# Patient Record
Sex: FEMALE | Race: OTHER | HISPANIC OR LATINO
[De-identification: names, ages, dates, MRNs, and addresses within clinical notes are randomized per-mention and may not be internally consistent; named-entity substitution may affect disease eponyms.]

---

## 2019-07-23 ENCOUNTER — APPOINTMENT (OUTPATIENT)
Dept: OBGYN | Facility: CLINIC | Age: 39
End: 2019-07-23
Payer: COMMERCIAL

## 2019-07-23 VITALS
HEIGHT: 62 IN | WEIGHT: 169 LBS | BODY MASS INDEX: 31.1 KG/M2 | SYSTOLIC BLOOD PRESSURE: 118 MMHG | DIASTOLIC BLOOD PRESSURE: 70 MMHG

## 2019-07-23 DIAGNOSIS — Z00.00 ENCOUNTER FOR GENERAL ADULT MEDICAL EXAMINATION W/OUT ABNORMAL FINDINGS: ICD-10-CM

## 2019-07-23 DIAGNOSIS — Z85.3 PERSONAL HISTORY OF MALIGNANT NEOPLASM OF BREAST: ICD-10-CM

## 2019-07-23 DIAGNOSIS — Z78.9 OTHER SPECIFIED HEALTH STATUS: ICD-10-CM

## 2019-07-23 DIAGNOSIS — Z85.850 PERSONAL HISTORY OF MALIGNANT NEOPLASM OF THYROID: ICD-10-CM

## 2019-07-23 DIAGNOSIS — Z80.8 FAMILY HISTORY OF MALIGNANT NEOPLASM OF OTHER ORGANS OR SYSTEMS: ICD-10-CM

## 2019-07-23 DIAGNOSIS — Z01.419 ENCOUNTER FOR GYNECOLOGICAL EXAMINATION (GENERAL) (ROUTINE) W/OUT ABNORMAL FINDINGS: ICD-10-CM

## 2019-07-23 DIAGNOSIS — Z80.3 FAMILY HISTORY OF MALIGNANT NEOPLASM OF BREAST: ICD-10-CM

## 2019-07-23 PROCEDURE — 36415 COLL VENOUS BLD VENIPUNCTURE: CPT

## 2019-07-23 PROCEDURE — 99385 PREV VISIT NEW AGE 18-39: CPT

## 2019-07-24 LAB
LH SERPL-ACNC: 6.9 IU/L
T3 SERPL-MCNC: 87 NG/DL
T3FREE SERPL-MCNC: 2.96 PG/ML
T4 FREE SERPL-MCNC: 1.2 NG/DL
T4 SERPL-MCNC: 4.9 UG/DL
TSH SERPL-ACNC: 2.81 UIU/ML

## 2019-08-06 LAB
CYTOLOGY CVX/VAG DOC THIN PREP: ABNORMAL
HPV HIGH+LOW RISK DNA PNL CVX: NOT DETECTED

## 2019-08-14 NOTE — HISTORY OF PRESENT ILLNESS
[1 Year Ago] : 1 year ago [Good] : being in good health [Healthy Diet] : a healthy diet [Regular Exercise] : regular exercise [Up to Date] : up to date with ~his/her~ STD screening [Menarche ____ yo] : menarche at [unfilled] yo [M. Frequency ___ (days)] : menses frequency [unfilled] days [M. Duration ___ (days)] : menses duration [unfilled] day(s) [Current] : cancer screening reviewed and current [Pap Smear Last Year] : Papanicolaou cytology last year [No Previous Mammogram] : no previous mammogram [No Previous CRC Screening] : no previous screening [Weight Concerns] : no concerns with her weight [Menstrual Problems] : reports normal menses [Fever] : no fever [Nausea] : no nausea [Vomiting] : no vomiting [Diarrhea] : no diarrhea [Pelvic Pressure] : no pelvic pressure [Vaginal Bleeding] : no vaginal bleeding [Dysuria] : no dysuria [Burning] : no burning [Lesion] : no lesion [Localized Pain] : no localized pain [Irregular Menses] : normal menses [Hot Flashes] : no hot flashes [Patient travelled to an area with active Zika Virus transmission in the last 6 months] : Patient is trying to get pregnant and she did not travel to an area with active Zika virus transmission in the last 6 months. [Sexually Active] : is not sexually active [Patient's partner travelled to an area with active Zika Virus transmission in the last 6 months] : Patient's partner did not travel to an area with active Zika Virus transmission in the last 6 months

## 2021-06-30 ENCOUNTER — APPOINTMENT (OUTPATIENT)
Dept: OBGYN | Facility: CLINIC | Age: 41
End: 2021-06-30

## 2023-07-27 ENCOUNTER — EMERGENCY (EMERGENCY)
Facility: HOSPITAL | Age: 43
LOS: 0 days | Discharge: HOME | End: 2023-07-28
Attending: EMERGENCY MEDICINE
Payer: COMMERCIAL

## 2023-07-27 VITALS
WEIGHT: 160.06 LBS | TEMPERATURE: 99 F | SYSTOLIC BLOOD PRESSURE: 137 MMHG | HEART RATE: 88 BPM | DIASTOLIC BLOOD PRESSURE: 85 MMHG | HEIGHT: 62 IN | RESPIRATION RATE: 18 BRPM | OXYGEN SATURATION: 98 %

## 2023-07-27 DIAGNOSIS — S43.402A UNSPECIFIED SPRAIN OF LEFT SHOULDER JOINT, INITIAL ENCOUNTER: ICD-10-CM

## 2023-07-27 DIAGNOSIS — S80.12XA CONTUSION OF LEFT LOWER LEG, INITIAL ENCOUNTER: ICD-10-CM
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
## 2023-07-27 DIAGNOSIS — S93.402A SPRAIN OF UNSPECIFIED LIGAMENT OF LEFT ANKLE, INITIAL ENCOUNTER: ICD-10-CM

## 2023-07-27 DIAGNOSIS — M25.512 PAIN IN LEFT SHOULDER: ICD-10-CM

## 2023-07-27 DIAGNOSIS — M25.522 PAIN IN LEFT ELBOW: ICD-10-CM
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
## 2023-07-27 DIAGNOSIS — M25.572 PAIN IN LEFT ANKLE AND JOINTS OF LEFT FOOT: ICD-10-CM

## 2023-07-27 PROCEDURE — 99285 EMERGENCY DEPT VISIT HI MDM: CPT

## 2023-07-27 RX ORDER — METHOCARBAMOL 500 MG/1
750 TABLET, FILM COATED ORAL ONCE
Refills: 0 | Status: COMPLETED | OUTPATIENT
Start: 2023-07-27 | End: 2023-07-27

## 2023-07-27 RX ORDER — IBUPROFEN 200 MG
600 TABLET ORAL ONCE
Refills: 0 | Status: COMPLETED | OUTPATIENT
Start: 2023-07-27 | End: 2023-07-27

## 2023-07-27 RX ADMIN — Medication 600 MILLIGRAM(S): at 23:32

## 2023-07-27 RX ADMIN — METHOCARBAMOL 750 MILLIGRAM(S): 500 TABLET, FILM COATED ORAL at 23:33

## 2023-07-27 NOTE — ED PROVIDER NOTE - MUSCULOSKELETAL MINIMAL EXAM
left elbow tender to palpation along olecranon, small contusion noted, no deformity, ROM elbow intact, left shoulder slight limitation to elevation but passively motion intact.
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
## 2023-07-27 NOTE — ED PROVIDER NOTE - CARE PLAN
1 Principal Discharge DX:	Shoulder sprain  Secondary Diagnosis:	Left ankle sprain  Secondary Diagnosis:	Contusion of left lower leg
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
## 2023-07-27 NOTE — ED PROVIDER NOTE - NSFOLLOWUPINSTRUCTIONS_ED_ALL_ED_FT
Ankle Sprain  Illustration of an ankle sprain caused by a foot turning outward and a foot turning inward.   An ankle sprain is a stretch or tear in a ligament in the ankle. Ligaments are tissues that connect bones to each other.    The two most common types of ankle sprains are:  Inversion sprain. This happens when the foot turns inward and the ankle rolls outward. It affects the ligament on the outside of the foot (lateral ligament).  Eversion sprain. This happens when the foot turns outward and the ankle rolls inward. It affects the ligament on the inner side of the foot (medial ligament).  What are the causes?  This condition is often caused by accidentally rolling or twisting the ankle.    What increases the risk?  You are more likely to develop this condition if you play sports.    What are the signs or symptoms?  Comparison of a normal ankle and an ankle that is swollen and bruised.  Symptoms of this condition include:  Pain in your ankle.  Swelling.  Bruising. This may develop right after you sprain your ankle or 1–2 days later.  Trouble standing or walking, especially when you turn or change directions.  How is this diagnosed?  This condition is diagnosed with:  A physical exam. During the exam, your health care provider will press on certain parts of your foot and ankle and try to move them in certain ways.  X-ray imaging. These may be taken to see how severe the sprain is and to check for broken bones.  How is this treated?  This condition may be treated with:  A brace or splint. This is used to keep the ankle from moving until it heals.  An elastic bandage. This is used to support the ankle.  Crutches.  Pain medicine.  Surgery. This may be needed if the sprain is severe.  Physical therapy. This may help to improve the range of motion in the ankle.  Follow these instructions at home:  If you have a brace or a splint:    Wear the brace or splint as told by your health care provider. Remove it only as told by your health care provider.  Loosen the brace or splint if your toes tingle, become numb, or turn cold and blue.  Keep the brace or splint clean.  If the brace or splint is not waterproof:  Do not let it get wet.  Cover it with a watertight covering when you take a bath or a shower.  If you have an elastic bandage (dressing):    Remove it to shower or bathe.  Try not to move your ankle much, but wiggle your toes from time to time. This helps to prevent swelling.  Adjust the dressing to make it more comfortable if it feels too tight.  Loosen the dressing if you have numbness or tingling in your foot, or if your foot becomes cold and blue.  Managing pain, stiffness, and swelling    A bag of ice on a towel on the skin.  Take over-the-counter and prescription medicines only as told by your health care provider.  For 2–3 days, keep your ankle raised (elevated) above the level of your heart as much as possible.  If directed, put ice on the injured area:  If you have a removable brace or splint, remove it as told by your health care provider.  Put ice in a plastic bag.  Place a towel between your skin and the bag.  Leave the ice on for 20 minutes, 2–3 times a day.  General instructions    Rest your ankle.  Do not use the injured limb to support your body weight until your health care provider says that you can. Use crutches as told by your health care provider.  Do not use any products that contain nicotine or tobacco, such as cigarettes, e-cigarettes, and chewing tobacco. If you need help quitting, ask your health care provider.  Keep all follow-up visits as told by your health care provider. This is important.  Contact a health care provider if:  You have rapidly increasing bruising or swelling.  Your pain is not relieved with medicine.  Get help right away if:  Your foot or toes become numb or blue.  You have severe pain that gets worse.  Summary  An ankle sprain is a stretch or tear in a ligament in the ankle. Ligaments are tissues that connect bones to each other.  This condition is often caused by accidentally rolling or twisting the ankle.  Symptoms include pain, swelling, bruising, and trouble walking.  To relieve pain and swelling, put ice on the affected ankle, raise your ankle above the level of your heart, and use an elastic bandage.  Keep all follow-up visits as told by your health care provider. This is important.  This information is not intended to replace advice given to you by your health care provider. Make sure you discuss any questions you have with your health care provider.      Shoulder Sprain  Body outline showing the skeleton, with a close-up of ligaments in the shoulder.  A shoulder sprain is a partial or complete tear in one of the tough, fiber-like tissues (ligaments) in the shoulder. The ligaments in the shoulder help to hold the shoulder in place.    What are the causes?  This condition may be caused by:  A fall.  A hit to the shoulder.  A twist of the arm.  What increases the risk?  You are more likely to develop this condition if you:  Play sports.  Have problems with balance or coordination.  What are the signs or symptoms?  Symptoms of this condition include:  Pain when moving the shoulder.  Limited ability to move the shoulder.  Swelling and tenderness on top of the shoulder.  Warmth in the shoulder.  A change in the shape of the shoulder.  Redness or bruising on the shoulder.  How is this diagnosed?  This condition is diagnosed with:  A physical exam. During the exam, you may be asked to do simple exercises with your shoulder.  Imaging tests such as X-rays, MRI, or a CT scan. These tests can show how severe the sprain is.  How is this treated?  This condition may be treated with:  Rest.  Pain medicine.  Ice.  A sling or brace. This is used to keep the arm still while the shoulder is healing.  Physical therapy or rehabilitation exercises. These help to improve the range of motion and strength of the shoulder.  Surgery (rare). Surgery may be needed if the sprain caused a joint to become unstable. Surgery may also be needed to reduce pain.  Some people may develop ongoing shoulder pain or lose some range of motion in the shoulder. However, most people do not develop long-term problems.    Follow these instructions at home:  Bag of ice on a towel on the skin.  If you have a sling or brace:    Wear the sling or brace as told by your health care provider. Remove it only as told by your health care provider.  Loosen the sling or brace if your fingers tingle, become numb, or turn cold and blue.  Keep the sling or brace clean.  If the sling or brace is not waterproof:  Do not let it get wet.  Cover it with a watertight covering when you take a bath or shower.  Activity    Rest your shoulder.  Move your arm only as much as told by your health care provider, but move your hand and fingers often to prevent stiffness and swelling.  Return to your normal activities as told by your health care provider. Ask your health care provider what activities are safe for you.  Ask your health care provider when it is safe for you to drive if you have a sling or brace on your shoulder.  If you were shown how to do any exercises, do them as told by your health care provider.  General instructions    If directed, put ice on the affected area.  Put ice in a plastic bag.  Place a towel between your skin and the bag.  Leave the ice on for 20 minutes, 2–3 times a day.  Take over-the-counter and prescription medicines only as told by your health care provider.  Do not use any products that contain nicotine or tobacco, such as cigarettes, e-cigarettes, and chewing tobacco. These can delay healing. If you need help quitting, ask your health care provider.  Keep all follow-up visits as told by your health care provider. This is important.  Contact a health care provider if:  Your pain gets worse.  Your pain is not relieved with medicines.  You have increased redness or swelling.  Get help right away if:  You have a fever.  You cannot move your arm or shoulder.  You develop severe numbness or tingling in your arm, hand, or fingers.  Your arm, hand, or fingers feel cold and turn blue, white, or gray.  Summary  A shoulder sprain is a partial or complete tear in one of the tough, fiber-like tissues (ligaments) in the shoulder.  This condition may be caused by a fall, a hit to the shoulder, or a twist of the arm.  Treatment usually includes rest, ice, and pain medicine as needed.  If you have a sling or brace, wear it as told by your health care provider. Remove it only as told by your health care provider.  This information is not intended to replace advice given to you by your health care provider. Make sure you discuss any questions you have with your health care provider.    Elbow Contusion  An elbow contusion is a deep bruise of the elbow. Contusions are the result of a blunt injury to tissues and muscle fibers under the skin. The injury causes bleeding under the skin. The skin overlying the contusion may turn blue, purple, or yellow. Minor injuries will give you a painless contusion, but more severe contusions may stay painful and swollen for a few weeks.    What are the causes?  Common causes of this condition include:  A hard hit to the elbow.  An injury (trauma) to the elbow.  Direct force on the elbow, such as from a fall.  What increases the risk?  You are more likely to develop this condition if you:  Play sports or do other physical activities.  Use blood thinners.  What are the signs or symptoms?  Symptoms of this condition include:  Swelling of the elbow.  Pain and tenderness of the elbow.  Discoloration of the elbow. The area may have redness and then turn blue, purple, or yellow.  How is this diagnosed?  This condition is diagnosed based on:  Your symptoms and medical history.  A physical exam.  You may also need an X-ray to determine if there are any associated injuries, such as broken bones (fractures).    An MRI might be done if the swelling and pain do not go away in a few weeks.    How is this treated?  This condition may be treated with:  Rest, ice, pressure (compression), and elevation. This is often called RICE therapy. In general, this is considered the best treatment for this condition.  A sling or splint to support your injury.  Over-the-counter anti-inflammatory medicines, such as ibuprofen, for pain control.  Range-of-motion exercises.  Follow these instructions at home:  RICE therapy    Rest the injured area.  If directed, put ice on the injured area:  If you have a removable sling or splint, remove it as told by your health care provider.  Put ice in a plastic bag.  Place a towel between your skin and the bag.  Leave the ice on for 20 minutes, 2–3 times a day.  If directed, apply light compression to the injured area using an elastic bandage. Make sure the bandage is not wrapped too tightly. Remove and reapply the bandage as directed by your health care provider.  A person holding an ice pack on an injured elbow.Raise (elevate) the injured area above the level of your heart while you are sitting or lying down.  If you have a sling or splint:    A sling on a person's arm.  Wear the sling or splint as told by your health care provider. Remove it only as told by your health care provider.  Loosen the sling or splint if your fingers tingle, become numb, or turn cold and blue.  Keep the sling or splint clean.  If the sling or splint is not waterproof:  Do not let it get wet.  Cover it with a watertight covering when you take a bath or a shower.  General instructions    Take over-the-counter and prescription medicines only as told by your health care provider.  Return to your normal activities as told by your health care provider. Ask your health care provider what activities are safe for you.  Do range-of-motion exercises only as told by your health care provider.  Ask your health care provider when it is safe to drive if you have a sling or splint on your arm.  Wear elbow pads as told by your health care provider.  Keep all follow-up visits as told by your health care provider. This is important.  Contact a health care provider if:  Your symptoms do not improve after several days of treatment.  You have more redness, swelling, or pain in your elbow.  You have difficulty moving the injured area.  Your swelling or pain is not relieved with medicines.  Get help right away if:  Your skin over the contusion breaks and starts bleeding.  You have severe pain.  You have numbness in your hand or fingers.  Your hand or fingers turn pale or cold.  You have swelling of your hand and fingers.  You cannot move your fingers or wrist.  Summary  An elbow contusion is a deep bruise of the elbow.  Symptoms include pain, swelling, and discoloration of the elbow.  Rest the injured area and apply ice to the area as told by your health care provider.  If directed, apply light compression to the injured area using an elastic bandage.  Raise (elevate) the injured area above the level of your heart while you are sitting or lying down.  This information is not intended to replace advice given to you by your health care provider. Make sure you discuss any questions you have with your health care provider.

## 2023-07-27 NOTE — ED PROVIDER NOTE - CLINICAL SUMMARY MEDICAL DECISION MAKING FREE TEXT BOX
left shoulder with some limitation on movement with negative xray shoulder, elbow, humerus, forearm without notable fracture and otherwise no dislocation notable. Left ankle,tib/fib negative for fx or dislocation as well - otherwise given ibuprofen/robaxin with some improvement and ROM improved. Will dc with sling and left ankle ace wrap with cane given for weight bearing as tolerated - with xray imaging reviewed with orthopedics - will dc with pmd follow up or outpt ortho follow up if not improved.

## 2023-07-27 NOTE — ED ADULT NURSE NOTE - NSFALLUNIVINTERV_ED_ALL_ED
Bed/Stretcher in lowest position, wheels locked, appropriate side rails in place/Call bell, personal items and telephone in reach/Instruct patient to call for assistance before getting out of bed/chair/stretcher/Non-slip footwear applied when patient is off stretcher/[place] to call system/Physically safe environment - no spills, clutter or unnecessary equipment/Purposeful proactive rounding/Room/bathroom lighting operational, light cord in reach

## 2023-07-27 NOTE — ED ADULT NURSE NOTE - DOES PATIENT HAVE ADVANCE DIRECTIVE
Patient was notified by phone of positive COVID results.     Pt feeling: well, improving. States fevers, resolved. No sob. pawan po intake    Discussed findings, expected treatment course.     Reviewed isolation precautions.    Pt is a candidate for bamlanivimab/etesivimab: No     Pt relays sx onset was actually >10d    Discussed return precautions.    Pt agreeable with plan     Lauri Coreas MD  05/05/21 [id number]     No

## 2023-07-27 NOTE — ED PROVIDER NOTE - PATIENT PORTAL LINK FT
You can access the FollowMyHealth Patient Portal offered by [facility] by registering at the following website: [email or web address]. By joining [facility]’s FollowMyHealth portal, you will also be able to view your health information using other applications (apps) compatible with our system.
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
## 2023-07-28 VITALS
RESPIRATION RATE: 16 BRPM | SYSTOLIC BLOOD PRESSURE: 123 MMHG | OXYGEN SATURATION: 99 % | TEMPERATURE: 98 F | DIASTOLIC BLOOD PRESSURE: 85 MMHG | HEART RATE: 77 BPM

## 2023-07-28 PROCEDURE — 73030 X-RAY EXAM OF SHOULDER: CPT | Mod: 26,LT

## 2023-07-28 PROCEDURE — 73600 X-RAY EXAM OF ANKLE: CPT | Mod: 26,LT

## 2023-07-28 PROCEDURE — 73590 X-RAY EXAM OF LOWER LEG: CPT | Mod: 26,LT

## 2023-07-28 PROCEDURE — 73080 X-RAY EXAM OF ELBOW: CPT | Mod: 26,LT

## 2023-07-28 PROCEDURE — 73060 X-RAY EXAM OF HUMERUS: CPT | Mod: 26,LT

## 2023-07-28 PROCEDURE — 73090 X-RAY EXAM OF FOREARM: CPT | Mod: 26,LT

## 2023-07-28 RX ORDER — METHOCARBAMOL 500 MG/1
2 TABLET, FILM COATED ORAL
Qty: 18 | Refills: 0
Start: 2023-07-28 | End: 2023-07-30

## 2023-07-28 RX ORDER — IBUPROFEN 200 MG
1 TABLET ORAL
Qty: 20 | Refills: 0
Start: 2023-07-28 | End: 2023-08-01

## 2023-07-28 RX ORDER — METHOCARBAMOL 500 MG/1
2 TABLET, FILM COATED ORAL
Refills: 0
Start: 2023-07-28

## 2023-07-28 RX ORDER — IBUPROFEN 200 MG
1 TABLET ORAL
Qty: 15 | Refills: 0
Start: 2023-07-28 | End: 2023-08-01

## 2023-07-28 NOTE — ED ADULT NURSE REASSESSMENT NOTE - NS ED NURSE REASSESS COMMENT FT1
Wound care provided, pt placed in shoulder sling and demonstrates safe ambulation with cane. Pt awaiting discharge paperwork.